# Patient Record
Sex: MALE | Race: WHITE | NOT HISPANIC OR LATINO | URBAN - METROPOLITAN AREA
[De-identification: names, ages, dates, MRNs, and addresses within clinical notes are randomized per-mention and may not be internally consistent; named-entity substitution may affect disease eponyms.]

---

## 2019-11-18 ENCOUNTER — COMMUNICATION - HEALTHEAST (OUTPATIENT)
Dept: NEUROSURGERY | Facility: CLINIC | Age: 64
End: 2019-11-18

## 2019-11-19 ENCOUNTER — AMBULATORY - HEALTHEAST (OUTPATIENT)
Dept: NEUROSURGERY | Facility: CLINIC | Age: 64
End: 2019-11-19

## 2019-11-19 DIAGNOSIS — M54.16 LUMBAR RADICULOPATHY: ICD-10-CM

## 2019-12-02 ENCOUNTER — HOSPITAL ENCOUNTER (OUTPATIENT)
Dept: MRI IMAGING | Facility: CLINIC | Age: 64
Discharge: HOME | End: 2019-12-02
Attending: SURGERY

## 2019-12-02 DIAGNOSIS — M54.16 LUMBAR RADICULOPATHY: ICD-10-CM

## 2019-12-03 ENCOUNTER — OFFICE VISIT - HEALTHEAST (OUTPATIENT)
Dept: NEUROSURGERY | Facility: CLINIC | Age: 64
End: 2019-12-03

## 2019-12-03 DIAGNOSIS — M51.26 LUMBAR HERNIATED DISC: ICD-10-CM

## 2019-12-03 ASSESSMENT — MIFFLIN-ST. JEOR: SCORE: 1791.9

## 2019-12-11 ENCOUNTER — COMMUNICATION - HEALTHEAST (OUTPATIENT)
Dept: NEUROSURGERY | Facility: CLINIC | Age: 64
End: 2019-12-11

## 2019-12-11 ENCOUNTER — SURGERY - HEALTHEAST (OUTPATIENT)
Dept: NEUROSURGERY | Facility: CLINIC | Age: 64
End: 2019-12-11

## 2019-12-11 ENCOUNTER — RECORDS - HEALTHEAST (OUTPATIENT)
Dept: ADMINISTRATIVE | Facility: OTHER | Age: 64
End: 2019-12-11

## 2019-12-11 ENCOUNTER — AMBULATORY - HEALTHEAST (OUTPATIENT)
Dept: NEUROSURGERY | Facility: CLINIC | Age: 64
End: 2019-12-11

## 2019-12-11 DIAGNOSIS — M51.26 LUMBAR HERNIATED DISC: ICD-10-CM

## 2019-12-11 DIAGNOSIS — Z01.818 PRE-OP EXAM: ICD-10-CM

## 2019-12-12 ENCOUNTER — RECORDS - HEALTHEAST (OUTPATIENT)
Dept: ADMINISTRATIVE | Facility: OTHER | Age: 64
End: 2019-12-12

## 2019-12-12 ENCOUNTER — COMMUNICATION - HEALTHEAST (OUTPATIENT)
Dept: NEUROSURGERY | Facility: CLINIC | Age: 64
End: 2019-12-12

## 2019-12-12 ENCOUNTER — AMBULATORY - HEALTHEAST (OUTPATIENT)
Dept: NEUROSURGERY | Facility: CLINIC | Age: 64
End: 2019-12-12

## 2019-12-13 ENCOUNTER — AMBULATORY - HEALTHEAST (OUTPATIENT)
Dept: NEUROSURGERY | Facility: CLINIC | Age: 64
End: 2019-12-13

## 2021-06-03 VITALS
SYSTOLIC BLOOD PRESSURE: 111 MMHG | WEIGHT: 216 LBS | HEIGHT: 71 IN | HEART RATE: 72 BPM | BODY MASS INDEX: 30.24 KG/M2 | OXYGEN SATURATION: 98 % | DIASTOLIC BLOOD PRESSURE: 72 MMHG

## 2021-06-03 NOTE — PATIENT INSTRUCTIONS - HE
Provided complete information about approaching surgery.  Discussed discharge planning and expected outcomes after surgery as well as follow-ups and restrictions.  Emphasized on stop taking ASA, NSAID's, vitamins and /or OTC herbal supplements within 10 days and any anticoagulant meds within 7 days prior to surgery.  Reminded patient to bring all pertinent films to hospital the day of surgery.    NPO after midnight    Provided written pamphlet about surgery.  Answered all questions.  The  will call patient with all pre-op orders and instructions.  Patient to complete all required diagnostic tests prior to surgery.  If test are not completed this will cancel the surgery; contact the clinic nurses at [phone] if unable to complete test.

## 2021-06-03 NOTE — PROGRESS NOTES
The patient is a 64-year-old male.  He had a previous back operation at the L4-5 level by Dr. Georges.  That surgery helped.  He now complains of a 2-month history of back pain.  It got better for a while and then recurred about a month ago.  Then the back pain disappeared and the patient was left with severe left anterior thigh pain.  He cannot sleep because of it.  He has weakness in the left leg.  He has atrophy of the left quadriceps. No numbness.  Right leg is okay.  No trouble with bladder control.  He is otherwise in good health with no heart disease, lung disease, cancer, or diabetes.  On exam his BMI is 30.  He has absent knee reflexes bilaterally.  He has weakness of left quadriceps on testing partial knee bends one leg at a time.  He has left quadriceps atrophy.  On MRI he has a large extruded free fragment disc at L2-3 on the left which has migrated caudally to compress the left L3 nerve root.  I showed him the MRI pictures.  I recommended a laminectomy at L2-3 on the left with removal of herniated disc and decompression of the left L3 nerve root.  We included in the discussion the nature of the problem, nature of the surgery, rationale for doing it, goals, benefits, alternatives, and significant risks and complications.  I answered all his questions.  He said he was satisfied with the explanation and understood.  He requested surgery.  He gave informed consent to go ahead with surgery.  We are working on scheduling.  He is satisfied with the plan.  Total time 45 minutes, more than 50% spent counseling and/or coordinating care.

## 2021-06-03 NOTE — TELEPHONE ENCOUNTER
Patient coming in for a consult with Esvin 12/3 and has not had any recent imaging. Please review and order any additional imaging needed prior to appt. Having pain on the left side.

## 2021-06-03 NOTE — PROGRESS NOTES
Neurosurgery consultation was requested by: Former pt of Dr. Georges.   Pain: Denies back pain   Radicular Pain is present: Anterior left thigh pain   Lhermitte sign: no   Motor complaints: minor weakness  Sensory complaints: denies numbness and tingling   Gait and balance issues: yes   Bowel or bladder issues: Denies   Duration of SX is: 2 months   The symptoms are worse with: Morning   The symptoms are better with: getting up and moving   Injury:Denies  Severity is:   Patient has tried the following conservative measures: Medrol dose pack and did help while on it.    DAVID score is:  FELECIA Alvarez

## 2021-06-04 NOTE — TELEPHONE ENCOUNTER
Per JULIA Bartlett, patient has chosen to cancel his surgery (it was decided by patient to cancel when he was at his readiness visit on 12.13.19). Patient states his symptoms have improved.

## 2021-06-04 NOTE — TELEPHONE ENCOUNTER
ORDER FROM: Dr. Mcallister    PRE AUTHORIZATION: No PA required; Ok to schedule    METHOD OF PATIENT CONTACT: Spoke with patient over the phone; Best number to reach him: [phone]    PROCEDURE: Lumbar laminectomy L2-3 left, removal of herniated disc, decompression of left L3 nerve root    SURGICAL DATE: 12.19.19 @ 11:45 a.m.    READINESS VISIT: 12.13.19 @ 10:30    PCP, CLINIC, PHONE#: Dr. Hewitt; [facility]; [phone]; Pre-op physical: 12.12.19 @ 8:00 a.m., with Dr. Banda    FILM INFO: Lumbar MRI: 12.02.19 @ [facility]    SURGICAL LETTER: e-mailed 12.11.19

## 2021-06-04 NOTE — PROGRESS NOTES
Victor Manuel came in for his readiness visit for surgery on 12/19/2019. He reports a completely resolved pain in his left leg, he denies sensory or motor disturbance in LE. He would like to cancel his surgery. He will let us know should he develop new or worsening symptoms.  Dr. Mcallister was informed.  Tri Clarke RN, CNRN

## 2021-06-04 NOTE — PATIENT INSTRUCTIONS - HE
To OR as planned.     Please arrive 2.5 hours prior to scheduled surgery.    Nothing to eat or drink after midnight the night before surgery.     Bring all pertinent films to hospital the day of surgery.     Continue to refrain from NSAIDS (Ibuprofen, Aleve, Naprosyn), ASA, Over the counter herbal medications or supplements, anti-coagulants and blood thinners.     Using a washcloth and a bottle of provided Hibiclens, wash your body, avoiding your face and genitals. Preferably, shower the night before surgery and the morning of surgery using a half a bottle each time for your whole body shower.

## 2021-06-16 PROBLEM — M51.26 LUMBAR HERNIATED DISC: Status: ACTIVE | Noted: 2019-12-11

## 2021-06-20 NOTE — LETTER
Letter by Konstantin Mcallister MD at      Author: Konstantin Mcallister MD Service: -- Author Type: --    Filed:  Encounter Date: 12/11/2019 Status: Signed       Dear Mr. Olguin,    This letter will help in preparation for your upcoming surgery. Please contact us with any additional questions you may have regarding your surgery. Contact information for your surgery scheduler:   Debbi Roger and Rosana: [phone] ~ Lyubov Iverson, and Destini: [phone] ~ Bean    You are scheduled for: Lumbar Laminectomy/Decompression  With: Konstantin Mcallister M.D.  Date/Time: Thursday, December 19, 2019 at 11:45 a.m. (time subject to change)  Location: [place] [address]    Check in at the Welcome Desk inside the main doors of the [facility]. An escort will take you to the surgery waiting area. A nurse from [facility] (surgery admit unit) at the [facility] will call you with your exact arrival time to the hospital - typically one-and-a-half to two-and-a-half hours prior to your scheduled surgery time.     In the event of an emergency surgery case, there may be an adjustment to your start time for surgery.     PREPARING FOR YOUR SURGERY    *Pre-op Physical: 12/12/2019 at 8:00 a.m., with Dr. Banda at [facility] (Dr. Hewitt's office).      *Please discuss the necessity of receiving a pneumococcal vaccine prior to surgery at your pre-op physical. Recommended for all patients over the age of 65, or based on certain medical conditions.     *After the pre-op physical is complete, please have your clinic fax the visit note to your surgery scheduler at [phone].    *Pre-op Lab Work: Please have labs completed at [facility] on 12/12/2019. No appointment needed. You can walk in anytime between the hours of 7:00 a.m. - 7:00 p.m. Stop at the Welcome Desk, inside the main doors, and they will direct you to the outpatient lab.     *Readiness Visit:  12/13/2019 at 10:30 a.m.  at our office in [facility] in the [facility] at [address] [place] [address], [address], [place] [address].      *Ensure that you have completed your pre-op physical, along with any other necessary tests/appointments (listed above), prior to your Readiness Visit.             ADDITIONAL INFORMATION REGARDING YOUR SURGERY    Medications    Bring a list ALL of your medications, including any over-the-counter vitamins and herbal supplements to your Readiness Visit, and on the day of surgery.    DO NOT bring your medications with you the day of surgery.    Please see attached third sheet for more details on medications/vitamins/herbal supplements that should be discontinued prior to your surgery date.     If you are unsure if you should discontinue a medication/ vitamin/herbal supplement, please call our office and discuss with a nurse.    Continue taking your medications/vitamins/herbal supplements unless they are on the attached list.     Failure to follow the instructions regarding medications/vitamins/herbal supplements will result in cancellation of your surgery.    Day BEFORE Surgery    DO NOT shave near your surgical site. This can cause irritation of the skin    Using a washcloth and provided bottle of Hibiclens, shower the night BEFORE surgery, using a half bottle of Hibiclens to wash your body, avoiding face and genitals. The morning OF surgery, shower and use the second half of the bottle to wash your body, avoiding face and genitals. If you are unable to take a shower the morning of surgery, please discuss your options with the nurse at your readiness visit.     NOTHING  to eat after 11:00 p.m. the night prior to your procedure    CLEAR LIQUIDS: May have the following liquids up to two (2) hours before your arrival time at the hospital: water, plain black coffee (no cream or milk), plain black tea or plain green tea (no cream or milk), Gatorade or Propel Water.    SMOKING:  Stop smoking as far before surgery as possible, or as directed by your surgeon. NO tobacco products of any kind (cigarettes, e-cigarettes, chewing tobacco) beginning at 11:00 p.m. the night prior to your procedure.     ALCOHOL: You should stop drinking alcohol beginning at 11:00 p.m. the night prior to your procedure    Contact our office if you have symptoms of illness such as a fever of 101 or greater, chills, cough, sore throat, or if you develop a rash or any open sore    Day OF Surgery    If youve been instructed to take a medication(s) on the morning of surgery, please take with a very small sip of water.    Wear loose & comfortable clothing and flat shoes, Leave jewelry/valuables at home. If you wear contact lenses, remove them at home and wear glasses. Remove any body piercings. Remove nail polish.     Planning for Discharge    Start planning for your care after discharge as soon as you receive this letter.    If you have not made arrangements to have someone take you home and stay with you for the first 48 hours after discharge, your surgery will be cancelled.        PRE-OPERATIVE MEDICATION INSTRUCTIONS  Review this information with your primary care physician prior to discontinuing any of the medications listed below.  Notify your primary care physician that you have been instructed to discontinue these medications.    TEN (10) Days Prior to Surgery, STOP the Following Medications   Rhoda-Panola  Anacin  Aspirin  Excedrin  Pepto Bismol    **Before taking ANY over-the-counter medications, check the label for Aspirin   Non-steroidal   Anti-inflammatory Medications (NSAIDS)    Celebrex  Diclofenac (Cataflam)  Etodolac (Iodine)  Fenoprofen (Nalfon)  Ibuprofen (Advil, Motrin, Nuprin)  Indomethacin (Indocin)  Ketoprofen  Ketorolac (Toradol)  Melaxicam (Mobic)  Naproxen (AnaProx, Aleve, Naprosyn)  Relafen (Nabumetone)   Herbal Supplements (this is a partial list of herbals to be discontinued)    Stormy Lemos  Quai  Ephedra  Feverfew  Fish Oil  Flaxseed Oil  Garlic  Chiquis  Gingko  Ginseng  Goldenseal  Imitrex (Sumatriptan)  Kava  Krill Oil  Licorice  Multi Vitamins  [place] Wort  Valerian  Vitamin E  Yohimbe   CHECK WITH YOUR PRESCRIBING DOCTOR BEFORE STOPPING ANY BLOOD THINNERS (approximately 7 days prior to surgery)  (Coumadin, Plavix, Platel, Aggrenox, Effient (Prasugrel), Ticlid), Xarelto, and Pradaxa      ALWAYS CHECK WITH YOUR PRESCRIBING DOCTOR REGARDING THE MEDICATIONS LISTED BELOW; RECOMMENDED STOP TIME IS ALSO LISTED      If you are taking Lovenox, discontinue 24 HOURS prior to surgery    If you are taking weight loss medication, discontinue 7 days prior to surgery    If you are taking Metformin or Simvastatin, check with your primary care physician (or whoever has prescribed you this medication) regarding when to discontinue prior to surgery        and traditional parking is located at [facility] at [address] [place] [address]. Validation is done at the [facility] Desk in the [facility].